# Patient Record
Sex: FEMALE | Race: WHITE | NOT HISPANIC OR LATINO | Employment: FULL TIME | URBAN - METROPOLITAN AREA
[De-identification: names, ages, dates, MRNs, and addresses within clinical notes are randomized per-mention and may not be internally consistent; named-entity substitution may affect disease eponyms.]

---

## 2017-06-18 ENCOUNTER — COMMUNICATION - HEALTHEAST (OUTPATIENT)
Dept: FAMILY MEDICINE | Facility: CLINIC | Age: 47
End: 2017-06-18

## 2017-06-18 DIAGNOSIS — F41.1 ANXIETY STATE: ICD-10-CM

## 2017-06-18 DIAGNOSIS — F32.1 MAJOR DEPRESSIVE DISORDER, SINGLE EPISODE, MODERATE (H): ICD-10-CM

## 2017-09-17 ENCOUNTER — COMMUNICATION - HEALTHEAST (OUTPATIENT)
Dept: FAMILY MEDICINE | Facility: CLINIC | Age: 47
End: 2017-09-17

## 2017-09-17 DIAGNOSIS — F32.1 MAJOR DEPRESSIVE DISORDER, SINGLE EPISODE, MODERATE (H): ICD-10-CM

## 2017-09-17 DIAGNOSIS — F41.1 ANXIETY STATE: ICD-10-CM

## 2017-09-22 ENCOUNTER — HOSPITAL ENCOUNTER (OUTPATIENT)
Dept: MAMMOGRAPHY | Facility: CLINIC | Age: 47
Discharge: HOME | End: 2017-09-22
Attending: FAMILY MEDICINE

## 2017-09-22 DIAGNOSIS — Z12.31 VISIT FOR SCREENING MAMMOGRAM: ICD-10-CM

## 2017-11-24 ENCOUNTER — OFFICE VISIT - HEALTHEAST (OUTPATIENT)
Dept: FAMILY MEDICINE | Facility: CLINIC | Age: 47
End: 2017-11-24

## 2017-11-24 DIAGNOSIS — Z00.00 ROUTINE GENERAL MEDICAL EXAMINATION AT A HEALTH CARE FACILITY: ICD-10-CM

## 2017-11-24 DIAGNOSIS — M13.0 POLYARTHRITIS: ICD-10-CM

## 2017-11-24 DIAGNOSIS — F32.1 MAJOR DEPRESSIVE DISORDER, SINGLE EPISODE, MODERATE (H): ICD-10-CM

## 2017-11-24 DIAGNOSIS — R61 NIGHT SWEATS: ICD-10-CM

## 2017-11-24 DIAGNOSIS — Z12.4 SCREENING FOR CERVICAL CANCER: ICD-10-CM

## 2017-11-24 DIAGNOSIS — F41.1 ANXIETY STATE: ICD-10-CM

## 2017-11-27 LAB — ANA SER QL: 0.5 U

## 2017-11-30 LAB
HPV INTERPRETATION - HISTORICAL: NORMAL
HPV INTERPRETER - HISTORICAL: NORMAL

## 2017-12-06 LAB
BKR LAB AP ABNORMAL BLEEDING: NO
BKR LAB AP BIRTH CONTROL/HORMONES: NORMAL
BKR LAB AP CERVICAL APPEARANCE: NORMAL
BKR LAB AP GYN ADEQUACY: NORMAL
BKR LAB AP GYN INTERPRETATION: NORMAL
BKR LAB AP GYN OTHER FINDINGS: NORMAL
BKR LAB AP HPV REFLEX: NORMAL
BKR LAB AP LMP: NORMAL
BKR LAB AP PATIENT STATUS: NORMAL
BKR LAB AP PREVIOUS ABNORMAL: NORMAL
BKR LAB AP PREVIOUS NORMAL: NORMAL
HIGH RISK?: NO
PATH REPORT.COMMENTS IMP SPEC: NORMAL
RESULT FLAG (HE HISTORICAL CONVERSION): NORMAL

## 2018-03-08 ENCOUNTER — OFFICE VISIT - HEALTHEAST (OUTPATIENT)
Dept: CARDIOLOGY | Facility: CLINIC | Age: 48
End: 2018-03-08

## 2018-03-08 DIAGNOSIS — R07.9 CHEST PAIN, UNSPECIFIED TYPE: ICD-10-CM

## 2018-03-08 ASSESSMENT — MIFFLIN-ST. JEOR: SCORE: 1416.47

## 2018-03-13 ENCOUNTER — OFFICE VISIT - HEALTHEAST (OUTPATIENT)
Dept: FAMILY MEDICINE | Facility: CLINIC | Age: 48
End: 2018-03-13

## 2018-03-13 ENCOUNTER — HOSPITAL ENCOUNTER (OUTPATIENT)
Dept: MRI IMAGING | Facility: HOSPITAL | Age: 48
Discharge: HOME | End: 2018-03-13
Attending: FAMILY MEDICINE

## 2018-03-13 ENCOUNTER — HOSPITAL ENCOUNTER (OUTPATIENT)
Dept: RADIOLOGY | Facility: HOSPITAL | Age: 48
Discharge: HOME | End: 2018-03-13
Attending: FAMILY MEDICINE

## 2018-03-13 DIAGNOSIS — M54.50 LOW BACK PAIN RADIATING TO RIGHT LEG: ICD-10-CM

## 2018-03-13 DIAGNOSIS — M79.604 RIGHT LEG PAIN: ICD-10-CM

## 2018-03-13 DIAGNOSIS — M79.604 LOW BACK PAIN RADIATING TO RIGHT LEG: ICD-10-CM

## 2018-03-13 RX ORDER — IBUPROFEN 200 MG
600 TABLET ORAL EVERY 6 HOURS PRN
Status: SHIPPED | COMMUNITY
Start: 2018-03-13

## 2018-03-13 ASSESSMENT — MIFFLIN-ST. JEOR: SCORE: 1416.47

## 2018-03-14 ENCOUNTER — AMBULATORY - HEALTHEAST (OUTPATIENT)
Dept: FAMILY MEDICINE | Facility: CLINIC | Age: 48
End: 2018-03-14

## 2018-03-14 DIAGNOSIS — M54.16 LUMBAR RADICULOPATHY, ACUTE: ICD-10-CM

## 2018-03-15 ENCOUNTER — COMMUNICATION - HEALTHEAST (OUTPATIENT)
Dept: CARDIOLOGY | Facility: CLINIC | Age: 48
End: 2018-03-15

## 2018-03-19 ENCOUNTER — HOSPITAL ENCOUNTER (OUTPATIENT)
Dept: PHYSICAL MEDICINE AND REHAB | Facility: CLINIC | Age: 48
Discharge: HOME | End: 2018-03-19
Attending: FAMILY MEDICINE

## 2018-03-19 DIAGNOSIS — M53.3 SACROILIAC JOINT PAIN: ICD-10-CM

## 2018-03-19 DIAGNOSIS — M54.16 LUMBAR RADICULITIS: ICD-10-CM

## 2018-03-19 DIAGNOSIS — M51.26 LUMBAR DISC HERNIATION: ICD-10-CM

## 2018-03-20 ENCOUNTER — COMMUNICATION - HEALTHEAST (OUTPATIENT)
Dept: FAMILY MEDICINE | Facility: CLINIC | Age: 48
End: 2018-03-20

## 2018-03-20 ENCOUNTER — HOSPITAL ENCOUNTER (OUTPATIENT)
Dept: CT IMAGING | Facility: CLINIC | Age: 48
Discharge: HOME | End: 2018-03-20
Attending: INTERNAL MEDICINE

## 2018-03-20 DIAGNOSIS — R07.9 CHEST PAIN, UNSPECIFIED TYPE: ICD-10-CM

## 2018-03-20 LAB
BSA FOR ECHO PROCEDURE: 1.86 M2
CV CALCIUM SCORE AGATSTON LM: 0
CV CALCIUM SCORING AGATSON LAD: 0
CV CALCIUM SCORING AGATSTON CX: 0
CV CALCIUM SCORING AGATSTON RCA: 0
CV CALCIUM SCORING AGATSTON TOTAL: 0
LEFT VENTRICLE HEART RATE: 62 BPM

## 2018-03-20 ASSESSMENT — MIFFLIN-ST. JEOR: SCORE: 1380.19

## 2018-10-15 ENCOUNTER — HOSPITAL ENCOUNTER (OUTPATIENT)
Dept: MAMMOGRAPHY | Facility: CLINIC | Age: 48
Discharge: HOME | End: 2018-10-15
Attending: FAMILY MEDICINE

## 2018-10-15 DIAGNOSIS — Z12.31 VISIT FOR SCREENING MAMMOGRAM: ICD-10-CM

## 2019-02-03 ENCOUNTER — COMMUNICATION - HEALTHEAST (OUTPATIENT)
Dept: FAMILY MEDICINE | Facility: CLINIC | Age: 49
End: 2019-02-03

## 2019-02-03 DIAGNOSIS — F32.1 MAJOR DEPRESSIVE DISORDER, SINGLE EPISODE, MODERATE (H): ICD-10-CM

## 2019-02-03 DIAGNOSIS — F41.1 ANXIETY STATE: ICD-10-CM

## 2019-03-06 ENCOUNTER — OFFICE VISIT - HEALTHEAST (OUTPATIENT)
Dept: FAMILY MEDICINE | Facility: CLINIC | Age: 49
End: 2019-03-06

## 2019-03-06 DIAGNOSIS — F41.1 ANXIETY STATE: ICD-10-CM

## 2019-03-06 DIAGNOSIS — J06.9 VIRAL URI WITH COUGH: ICD-10-CM

## 2019-03-06 DIAGNOSIS — F32.1 MAJOR DEPRESSIVE DISORDER, SINGLE EPISODE, MODERATE (H): ICD-10-CM

## 2019-11-24 ENCOUNTER — COMMUNICATION - HEALTHEAST (OUTPATIENT)
Dept: FAMILY MEDICINE | Facility: CLINIC | Age: 49
End: 2019-11-24

## 2019-11-24 DIAGNOSIS — F32.1 MAJOR DEPRESSIVE DISORDER, SINGLE EPISODE, MODERATE (H): ICD-10-CM

## 2019-11-24 DIAGNOSIS — F41.1 ANXIETY STATE: ICD-10-CM

## 2019-12-27 ENCOUNTER — HOSPITAL ENCOUNTER (OUTPATIENT)
Dept: MAMMOGRAPHY | Facility: CLINIC | Age: 49
Discharge: HOME | End: 2019-12-27
Attending: FAMILY MEDICINE

## 2019-12-27 DIAGNOSIS — Z12.31 VISIT FOR SCREENING MAMMOGRAM: ICD-10-CM

## 2020-03-08 ENCOUNTER — COMMUNICATION - HEALTHEAST (OUTPATIENT)
Dept: FAMILY MEDICINE | Facility: CLINIC | Age: 50
End: 2020-03-08

## 2020-03-08 DIAGNOSIS — F41.1 ANXIETY STATE: ICD-10-CM

## 2020-03-08 DIAGNOSIS — F32.1 MAJOR DEPRESSIVE DISORDER, SINGLE EPISODE, MODERATE (H): ICD-10-CM

## 2020-09-28 ENCOUNTER — OFFICE VISIT - HEALTHEAST (OUTPATIENT)
Dept: FAMILY MEDICINE | Facility: CLINIC | Age: 50
End: 2020-09-28

## 2020-09-28 DIAGNOSIS — N92.1 MENORRHAGIA WITH IRREGULAR CYCLE: ICD-10-CM

## 2020-09-28 DIAGNOSIS — M19.041 ARTHRITIS OF BOTH HANDS: ICD-10-CM

## 2020-09-28 DIAGNOSIS — M19.042 ARTHRITIS OF BOTH HANDS: ICD-10-CM

## 2020-09-28 DIAGNOSIS — Z00.00 ROUTINE GENERAL MEDICAL EXAMINATION AT A HEALTH CARE FACILITY: ICD-10-CM

## 2020-09-28 DIAGNOSIS — Z12.11 SCREEN FOR COLON CANCER: ICD-10-CM

## 2020-09-28 LAB
CHOLEST SERPL-MCNC: 197 MG/DL
FASTING STATUS PATIENT QL REPORTED: YES
FASTING STATUS PATIENT QL REPORTED: YES
GLUCOSE BLD-MCNC: 79 MG/DL (ref 70–99)
HDLC SERPL-MCNC: 71 MG/DL
LDLC SERPL CALC-MCNC: 117 MG/DL
TRIGL SERPL-MCNC: 47 MG/DL
TSH SERPL DL<=0.005 MIU/L-ACNC: 0.97 UIU/ML (ref 0.3–5)

## 2020-09-28 RX ORDER — FLUOROURACIL 50 MG/G
CREAM TOPICAL
Status: SHIPPED | COMMUNITY
Start: 2020-05-14

## 2020-09-28 ASSESSMENT — MIFFLIN-ST. JEOR: SCORE: 1457.3

## 2020-09-28 ASSESSMENT — PATIENT HEALTH QUESTIONNAIRE - PHQ9: SUM OF ALL RESPONSES TO PHQ QUESTIONS 1-9: 0

## 2020-10-02 ENCOUNTER — AMBULATORY - HEALTHEAST (OUTPATIENT)
Dept: SURGERY | Facility: AMBULATORY SURGERY CENTER | Age: 50
End: 2020-10-02

## 2020-10-02 DIAGNOSIS — Z11.59 ENCOUNTER FOR SCREENING FOR OTHER VIRAL DISEASES: ICD-10-CM

## 2020-11-05 ENCOUNTER — AMBULATORY - HEALTHEAST (OUTPATIENT)
Dept: SURGERY | Facility: CLINIC | Age: 50
End: 2020-11-05

## 2020-11-05 DIAGNOSIS — Z11.59 ENCOUNTER FOR SCREENING FOR OTHER VIRAL DISEASES: ICD-10-CM

## 2020-11-06 ENCOUNTER — AMBULATORY - HEALTHEAST (OUTPATIENT)
Dept: LAB | Facility: CLINIC | Age: 50
End: 2020-11-06

## 2020-11-06 DIAGNOSIS — Z11.59 ENCOUNTER FOR SCREENING FOR OTHER VIRAL DISEASES: ICD-10-CM

## 2020-11-08 ENCOUNTER — COMMUNICATION - HEALTHEAST (OUTPATIENT)
Dept: SCHEDULING | Facility: CLINIC | Age: 50
End: 2020-11-08

## 2020-11-09 ENCOUNTER — ANESTHESIA - HEALTHEAST (OUTPATIENT)
Dept: SURGERY | Facility: AMBULATORY SURGERY CENTER | Age: 50
End: 2020-11-09

## 2020-11-09 ASSESSMENT — MIFFLIN-ST. JEOR: SCORE: 1448.22

## 2020-11-10 ENCOUNTER — SURGERY - HEALTHEAST (OUTPATIENT)
Dept: SURGERY | Facility: AMBULATORY SURGERY CENTER | Age: 50
End: 2020-11-10

## 2020-11-20 ENCOUNTER — AMBULATORY - HEALTHEAST (OUTPATIENT)
Dept: LAB | Facility: CLINIC | Age: 50
End: 2020-11-20

## 2020-11-20 DIAGNOSIS — Z11.59 ENCOUNTER FOR SCREENING FOR OTHER VIRAL DISEASES: ICD-10-CM

## 2020-11-20 ASSESSMENT — MIFFLIN-ST. JEOR: SCORE: 1425.54

## 2020-11-21 LAB
SARS-COV-2 PCR COMMENT: NORMAL
SARS-COV-2 RNA SPEC QL NAA+PROBE: NEGATIVE
SARS-COV-2 VIRUS SPECIMEN SOURCE: NORMAL

## 2020-11-22 ENCOUNTER — COMMUNICATION - HEALTHEAST (OUTPATIENT)
Dept: SCHEDULING | Facility: CLINIC | Age: 50
End: 2020-11-22

## 2020-11-24 ENCOUNTER — ANESTHESIA - HEALTHEAST (OUTPATIENT)
Dept: SURGERY | Facility: CLINIC | Age: 50
End: 2020-11-24

## 2020-11-24 ENCOUNTER — SURGERY - HEALTHEAST (OUTPATIENT)
Dept: SURGERY | Facility: CLINIC | Age: 50
End: 2020-11-24

## 2020-11-24 ASSESSMENT — MIFFLIN-ST. JEOR: SCORE: 1454.58

## 2021-05-27 ASSESSMENT — PATIENT HEALTH QUESTIONNAIRE - PHQ9: SUM OF ALL RESPONSES TO PHQ QUESTIONS 1-9: 0

## 2021-05-29 ENCOUNTER — RECORDS - HEALTHEAST (OUTPATIENT)
Dept: ADMINISTRATIVE | Facility: CLINIC | Age: 51
End: 2021-05-29

## 2021-05-30 ENCOUNTER — RECORDS - HEALTHEAST (OUTPATIENT)
Dept: ADMINISTRATIVE | Facility: CLINIC | Age: 51
End: 2021-05-30

## 2021-05-31 VITALS — WEIGHT: 175 LBS | BODY MASS INDEX: 28.25 KG/M2

## 2021-06-01 VITALS — HEIGHT: 66 IN | WEIGHT: 173 LBS | BODY MASS INDEX: 27.8 KG/M2

## 2021-06-01 VITALS — WEIGHT: 165 LBS | BODY MASS INDEX: 26.52 KG/M2 | HEIGHT: 66 IN

## 2021-06-01 VITALS — BODY MASS INDEX: 28.08 KG/M2 | WEIGHT: 174 LBS

## 2021-06-01 VITALS — BODY MASS INDEX: 27.8 KG/M2 | WEIGHT: 173 LBS | HEIGHT: 66 IN

## 2021-06-02 VITALS — WEIGHT: 175 LBS | BODY MASS INDEX: 28.25 KG/M2

## 2021-06-03 NOTE — TELEPHONE ENCOUNTER
Refill Approved    Rx renewed per Medication Renewal Policy. Medication was last renewed on 3/6/2019 with 1 refill.   Last office visit: 3/6/2019 with PCP Dr RICARDO Houser    Iselajihan Kim, Care Connection Triage/Med Refill 11/24/2019     Requested Prescriptions   Pending Prescriptions Disp Refills     buPROPion (WELLBUTRIN XL) 150 MG 24 hr tablet [Pharmacy Med Name: BUPROPION XL 150MG TABLETS (24 H)] 90 tablet 0     Sig: TAKE 1 TABLET(150 MG) BY MOUTH EVERY MORNING       Tricyclics/Misc Antidepressant/Antianxiety Meds Refill Protocol Passed - 11/24/2019  7:26 PM        Passed - PCP or prescribing provider visit in last year     Last office visit with prescriber/PCP: 3/6/2019 Isatu Houser MD OR same dept: 3/6/2019 Isatu Houser MD OR same specialty: 3/6/2019 Isatu Houser MD  Last physical: 11/24/2017 Last MTM visit: Visit date not found   Next visit within 3 mo: Visit date not found  Next physical within 3 mo: Visit date not found  Prescriber OR PCP: Isatu Houser MD  Last diagnosis associated with med order: 1. Anxiety state  - buPROPion (WELLBUTRIN XL) 150 MG 24 hr tablet [Pharmacy Med Name: BUPROPION XL 150MG TABLETS (24 H)]; TAKE 1 TABLET(150 MG) BY MOUTH EVERY MORNING  Dispense: 90 tablet; Refill: 0    2. Major depressive disorder, single episode, moderate (H)  - buPROPion (WELLBUTRIN XL) 150 MG 24 hr tablet [Pharmacy Med Name: BUPROPION XL 150MG TABLETS (24 H)]; TAKE 1 TABLET(150 MG) BY MOUTH EVERY MORNING  Dispense: 90 tablet; Refill: 0    If protocol passes may refill for 12 months if within 3 months of last provider visit (or a total of 15 months).

## 2021-06-05 ENCOUNTER — RECORDS - HEALTHEAST (OUTPATIENT)
Dept: LAB | Facility: CLINIC | Age: 51
End: 2021-06-05

## 2021-06-05 VITALS — BODY MASS INDEX: 29.15 KG/M2 | HEIGHT: 66 IN | WEIGHT: 181.4 LBS

## 2021-06-05 VITALS
OXYGEN SATURATION: 98 % | SYSTOLIC BLOOD PRESSURE: 120 MMHG | HEART RATE: 68 BPM | WEIGHT: 182 LBS | BODY MASS INDEX: 29.25 KG/M2 | DIASTOLIC BLOOD PRESSURE: 68 MMHG | HEIGHT: 66 IN

## 2021-06-05 VITALS — BODY MASS INDEX: 28.93 KG/M2 | HEIGHT: 66 IN | WEIGHT: 180 LBS

## 2021-06-05 DIAGNOSIS — N94.9 SYMPTOM ASSOCIATED WITH FEMALE GENITAL ORGANS: ICD-10-CM

## 2021-06-06 NOTE — TELEPHONE ENCOUNTER
Refill NOT Given    Refill given per Policy, patient informed they are overdue for Office Visit   OV 3/6/19    Saima Akhtar, [facility] Connection Triage/Med Refill 3/8/2020    Requested Prescriptions   Pending Prescriptions Disp Refills     buPROPion (WELLBUTRIN XL) 150 MG 24 hr tablet [Pharmacy Med Name: BUPROPION XL 150MG TABLETS (24 H)] 90 tablet 0     Sig: TAKE 1 TABLET(150 MG) BY MOUTH EVERY MORNING       Tricyclics/Misc Antidepressant/Antianxiety Meds Refill Protocol Failed - 3/8/2020 10:34 AM        Failed - PCP or prescribing provider visit in last year     Last office visit with prescriber/PCP: 3/6/2019 Isatu Houser MD OR same dept: Visit date not found OR same specialty: 3/6/2019 Isatu Houser MD  Last physical: 11/24/2017 Last MTM visit: Visit date not found   Next visit within 3 mo: Visit date not found  Next physical within 3 mo: Visit date not found  Prescriber OR PCP: Isatu Houser MD  Last diagnosis associated with med order: 1. Anxiety state  - buPROPion (WELLBUTRIN XL) 150 MG 24 hr tablet [Pharmacy Med Name: BUPROPION XL 150MG TABLETS (24 H)]; TAKE 1 TABLET(150 MG) BY MOUTH EVERY MORNING  Dispense: 90 tablet; Refill: 0    2. Major depressive disorder, single episode, moderate (H)  - buPROPion (WELLBUTRIN XL) 150 MG 24 hr tablet [Pharmacy Med Name: BUPROPION XL 150MG TABLETS (24 H)]; TAKE 1 TABLET(150 MG) BY MOUTH EVERY MORNING  Dispense: 90 tablet; Refill: 0    If protocol passes may refill for 12 months if within 3 months of last provider visit (or a total of 15 months).

## 2021-06-11 NOTE — PROGRESS NOTES
Assessment/Plan:      Healthy female exam.   Problem List Items Addressed This Visit     None      Visit Diagnoses     Routine general medical examination at a health care facility    -  Primary    Relevant Orders    Glucose (Completed)    Lipid Profile    Screen for colon cancer        Relevant Orders    Ambulatory referral for Colonoscopy    Arthritis of both hands        Relevant Orders    Ambulatory referral to Rheumatology    Menorrhagia with irregular cycle        Relevant Orders    Thyroid Socorro    Ambulatory referral to Gynecology        The patient has a family history of significant arthritis of her hands.  By history, it sounds like osteoarthritis and this would fit with the visual appearance of her hands today as well as a negative work-up in the recent past.  However, she is quite concerned about it and as such I think it is reasonable for her to have a discussion with a rheumatologist.  I did mention diclofenac cream as an option she could try for symptomatic relief.  In addition, the patient is having increasingly heavy and prolonged menstrual cycles.  I explained that this really does deserve some evaluation and a pelvic ultrasound would be a good start.  However, after discussing some of the treatment options and that she would more strongly consider a uterine ablation, I recommended simply getting a consultation with a gynecologist for further evaluation and treatment.       Subjective:      Sonia Cordero is a 50 y.o. female who presents for an annual exam.  The patient is overall doing reasonably well although has had some increased stressors recently including a son who has had recurrence of a rare soft tissue cancer of his arm.  From a health perspective, the patient reports concern regarding increasing pain and some deformity involving her hands bilaterally she has a strong family history of arthritis of her hands and a couple of female relatives including her grandmother.  Also, the patient  has been having increasingly irregular as well as heavy and prolonged menstrual cycles.  It is bothersome enough that she would consider doing something at this point although does not want hormones or an IUD and would consider a uterine ablation.    Healthy Habits:   Regular Exercise: No  Sunscreen Use: Yes  Healthy Diet: struggling  Dental Visits Regularly: Yes  Seat Belt: Yes  Sexually active: Yes  Colon cancer screening: Yes  Lipid Profile: Yes  Glucose Screen: Yes  Prevention of Osteoporosis: Yes  Last Dexa: N/A      Immunization History   Administered Date(s) Administered     Hep A, historic 2008, 2009     INFLUENZA,SEASONAL QUAD, PF, =/> 6months 10/17/2016, 2020     Influenza, inj, historic,unspecified 10/04/2012     Td, adult adsorbed, PF 10/17/2016     Tdap 2007     Immunization status: up to date and documented.    Gynecologic History  No LMP recorded.  Contraception: none  Last Pap: 2017. Results were: normal  Last mammogram: . Results were: normal      OB History    Para Term  AB Living   4 4 4         SAB TAB Ectopic Multiple Live Births                  # Outcome Date GA Lbr Umer/2nd Weight Sex Delivery Anes PTL Lv   4 Term            3 Term            2 Term            1 Term                Current Outpatient Medications   Medication Sig Dispense Refill     cephalexin (KEFLEX) 500 MG capsule Take 500 mg by mouth 2 (two) times a day.       cholecalciferol, vitamin D3, 50 mcg (2,000 unit) Tab Take 2,000 Units by mouth.       fluorouraciL (EFUDEX) 5 % cream APPLY ONE TIME A DAY TO ACTINIC KERATOSES FOR 4 WEEKS. WASH OFF IN THE MORNING       ibuprofen (ADVIL,MOTRIN) 200 MG tablet Take 600 mg by mouth every 6 (six) hours as needed for pain.       Lactobacillus acidophilus (PROBIOTIC ORAL) Take by mouth.       No current facility-administered medications for this visit.      Past Medical History:   Diagnosis Date     Breast cyst      Past Surgical History:    Procedure Laterality Date     BREAST CYST EXCISION Right 1997     Patient has no known allergies.  Family History   Problem Relation Age of Onset     Breast cancer Maternal Grandmother 80     Breast cancer Maternal Aunt 40     Lung cancer Maternal Grandfather      Acute Myocardial Infarction Father      Acute Myocardial Infarction Paternal Aunt      Acute Myocardial Infarction Paternal Uncle      Social History     Socioeconomic History     Marital status:      Spouse name: Not on file     Number of children: Not on file     Years of education: Not on file     Highest education level: Not on file   Occupational History     Not on file   Social Needs     Financial resource strain: Not on file     Food insecurity     Worry: Not on file     Inability: Not on file     Transportation needs     Medical: Not on file     Non-medical: Not on file   Tobacco Use     Smoking status: Never Smoker     Smokeless tobacco: Never Used   Substance and Sexual Activity     Alcohol use: Not on file     Drug use: Not on file     Sexual activity: Not on file   Lifestyle     Physical activity     Days per week: Not on file     Minutes per session: Not on file     Stress: Not on file   Relationships     Social connections     Talks on phone: Not on file     Gets together: Not on file     Attends [employer] service: Not on file     Active member of club or organization: Not on file     Attends meetings of clubs or organizations: Not on file     Relationship status: Not on file     Intimate partner violence     Fear of current or ex partner: Not on file     Emotionally abused: Not on file     Physically abused: Not on file     Forced sexual activity: Not on file   Other Topics Concern     Not on file   Social History Narrative     Not on file       Review of Systems  General:  Denies problem  Eyes: Denies problem  Ears/Nose/Throat: Denies problem  Cardiovascular: Denies problem  Respiratory:  Denies problem  Gastrointestinal:  Denies  "problem, Genitourinary: Denies problem  Musculoskeletal:  Denies problem  Skin: Denies problem  Neurologic: Denies problem  Psychiatric: Denies problem  Endocrine: Denies problem  Heme/Lymphatic: Denies problem   Allergic/Immunologic: Denies problem        Objective:         Vitals:    09/28/20 0919   BP: 120/68   Pulse: 68   SpO2: 98%   Weight: 182 lb (82.6 kg)   Height: 5' 6\" (1.676 m)     Body mass index is 29.38 kg/m .    Physical Exam:  General Appearance: Alert, cooperative, no distress, appears stated age  Head: Normocephalic, without obvious abnormality, atraumatic  Eyes: PERRL, conjunctiva/corneas clear, EOM's intact  Ears: Normal TM's and external ear canals, both ears  Nose: Nares normal, septum midline,mucosa normal, no drainage  Throat: Lips, mucosa, and tongue normal; teeth and gums normal  Neck: Supple, symmetrical, trachea midline, no adenopathy;  thyroid: not enlarged, symmetric, no tenderness/mass/nodules; no carotid bruit or JVD  Back: Symmetric, no curvature, ROM normal, no CVA tenderness  Lungs: Clear to auscultation bilaterally, respirations unlabored  Breasts: No breast masses, tenderness, asymmetry, or nipple discharge.  Heart: Regular rate and rhythm, S1 and S2 normal, no murmur, rub, or gallop, Abdomen: Soft, non-tender, bowel sounds active all four quadrants,  no masses, no organomegaly  Pelvic:Not examined  Extremities: Extremities normal, atraumatic, no cyanosis or edema  Skin: Skin color, texture, turgor normal, no rashes or lesions  Lymph nodes: Cervical, supraclavicular, and axillary nodes normal  Neurologic: Normal        "

## 2021-06-12 NOTE — ANESTHESIA PREPROCEDURE EVALUATION
Anesthesia Evaluation      Patient summary reviewed     Airway   Mallampati: II  Neck ROM: full   Pulmonary - negative ROS and normal exam                          Cardiovascular - negative ROS and normal exam   Neuro/Psych    (+) depression, anxiety/panic attacks,     Endo/Other - negative ROS      GI/Hepatic/Renal - negative ROS           Dental - normal exam                        Anesthesia Plan  Planned anesthetic: MAC    ASA 2   Induction: intravenous   Anesthetic plan and risks discussed with: patient    Post-op plan: routine recovery

## 2021-06-12 NOTE — ANESTHESIA CARE TRANSFER NOTE
2Last vitals:   Vitals:    11/10/20 0718   BP: 127/67   Pulse: 73   Resp: 16   Temp: 36.9  C (98.5  F)   SpO2: 98%     Patient's level of consciousness is drowsy  Spontaneous respirations: yes  Maintains airway independently: yes  Dentition unchanged: yes  Oropharynx: oropharynx clear of all foreign objects    QCDR Measures:  ASA# 20 - Surgical Safety Checklist: WHO surgical safety checklist completed prior to induction    PQRS# 430 - Adult PONV Prevention: 4558F - Pt received => 2 anti-emetic agents (different classes) preop & intraop  ASA# 8 - Peds PONV Prevention: NA - Not pediatric patient, not GA or 2 or more risk factors NOT present  PQRS# 424 - Mariella-op Temp Management: 4559F - At least one body temp DOCUMENTED => 35.5C or 95.9F within required timeframe  PQRS# 426 - PACU Transfer Protocol: - Transfer of care checklist used  ASA# 14 - Acute Post-op Pain: ASA14B - Patient did NOT experience pain >= 7 out of 10

## 2021-06-13 NOTE — ANESTHESIA PREPROCEDURE EVALUATION
Anesthesia Evaluation        Airway   Mallampati: II   Pulmonary     breath sounds clear to auscultation  (-) asthma, shortness of breath                         Cardiovascular   Exercise tolerance: > or = 10 METS  (-) hypertension, angina  Rhythm: regular  Rate: normal,         Neuro/Psych    (+) anxiety/panic attacks,     Endo/Other    (-) no diabetes     GI/Hepatic/Renal    (-) renal disease          Dental                         Anesthesia Plan  Planned anesthetic: general endotracheal    ASA 2   Induction: intravenous   Anesthetic plan and risks discussed with: patient  Anesthesia plan special considerations: antiemetics,

## 2021-06-13 NOTE — ANESTHESIA POSTPROCEDURE EVALUATION
Patient: Sonia Cordero  Procedure(s):  TRANSVAGINAL TAPING  HYSTEROSCOPY DILATION AND CURETTAGE NOVASURE ENDOMETRIAL ABLATION  Anesthesia type: general    Patient location: PACU  Last vitals:   Vitals Value Taken Time   /63 11/24/20 1430   Temp 35.6  C (96.1  F) 11/24/20 1413   Pulse 67 11/24/20 1436   Resp 12 11/24/20 1436   SpO2 97 % 11/24/20 1436   Vitals shown include unvalidated device data.  Post vital signs: stable  Level of consciousness: awake and responds to simple questions  Post-anesthesia pain: pain controlled  Post-anesthesia nausea and vomiting: no  Pulmonary: unassisted, return to baseline  Cardiovascular: stable and blood pressure at baseline  Hydration: adequate  Anesthetic events: no    QCDR Measures:  ASA# 11 - Mariella-op Cardiac Arrest: ASA11B - Patient did NOT experience unanticipated cardiac arrest  ASA# 12 - Mariella-op Mortality Rate: ASA12B - Patient did NOT die  ASA# 13 - PACU Re-Intubation Rate: ASA13B - Patient did NOT require a new airway mgmt  ASA# 10 - Composite Anes Safety: ASA10A - No serious adverse event    Additional Notes:

## 2021-06-13 NOTE — ANESTHESIA POSTPROCEDURE EVALUATION
Patient: Sonia Cordero  Procedure(s):  COLONOSCOPY  Anesthesia type: MAC    Patient location: PACU  Last vitals:   Vitals Value Taken Time   /71 11/10/20 0845   Temp 36.2  C (97.1  F) 11/10/20 0816   Pulse 63 11/10/20 0859   Resp 16 11/10/20 0816   SpO2 95 % 11/10/20 0859     Post vital signs: stable  Level of consciousness: awake and responds to simple questions  Post-anesthesia pain: pain controlled  Post-anesthesia nausea and vomiting: no  Pulmonary: unassisted, return to baseline  Cardiovascular: stable and blood pressure at baseline  Hydration: adequate  Anesthetic events: no    QCDR Measures:  ASA# 11 - Mariella-op Cardiac Arrest: ASA11B - Patient did NOT experience unanticipated cardiac arrest  ASA# 12 - Mariella-op Mortality Rate: ASA12B - Patient did NOT die  ASA# 13 - PACU Re-Intubation Rate: ASA13B - Patient did NOT require a new airway mgmt  ASA# 10 - Composite Anes Safety: ASA10A - No serious adverse event    Additional Notes:

## 2021-06-13 NOTE — ANESTHESIA CARE TRANSFER NOTE
Last vitals:   Vitals:    11/24/20 1413   BP: 113/63   Pulse: 74   Resp: 16   Temp: (!) 35.6  C (96.1  F)   SpO2: 100%     Patient's level of consciousness is drowsy  Spontaneous respirations: yes  Maintains airway independently: yes  Dentition unchanged: yes  Oropharynx: oropharynx clear of all foreign objects    QCDR Measures:  ASA# 20 - Surgical Safety Checklist: WHO surgical safety checklist completed prior to induction    PQRS# 430 - Adult PONV Prevention: 4558F - Pt received => 2 anti-emetic agents (different classes) preop & intraop  ASA# 8 - Peds PONV Prevention: NA - Not pediatric patient, not GA or 2 or more risk factors NOT present  PQRS# 424 - Mariella-op Temp Management: 4559F - At least one body temp DOCUMENTED => 35.5C or 95.9F within required timeframe  PQRS# 426 - PACU Transfer Protocol: - Transfer of care checklist used  ASA# 14 - Acute Post-op Pain: ASA14B - Patient did NOT experience pain >= 7 out of 10

## 2021-06-14 NOTE — PROGRESS NOTES
Assessment/Plan:      Healthy female exam.   1. Anxiety state  - buPROPion (WELLBUTRIN XL) 150 MG 24 hr tablet; TAKE 1 TABLET BY MOUTH EVERY MORNING  Dispense: 90 tablet; Refill: 3    2. Major depressive disorder, single episode, moderate  - buPROPion (WELLBUTRIN XL) 150 MG 24 hr tablet; TAKE 1 TABLET BY MOUTH EVERY MORNING  Dispense: 90 tablet; Refill: 3    3. Routine general medical examination at a health care facility  Reviewed cancer screening and updated pap smear today. She is UTD on her mammogram. Discussed role of vitamin D and calcium in bone health. She is UTD on her immunizations. Discussed cardiovascular health with routine exercise and diet recommendations.     4. Polyarthritis  - Rheumatoid Factor Quant  - Erythrocyte Sedimentation Rate  - C-Reactive Protein  - Antinuclear Antibody (CLIFFORD) Cascade    5. Night sweats  - Thyroid Cascade  - HM2(CBC w/o Differential)    6. Screening for cervical cancer  - Gynecologic Cytology (PAP Smear)         Subjective:      Sonia Cordero is a 47 y.o. female who presents for an annual exam. The patient overall is doing well, although does have a question related to her joints of her hands. She reports that her joints of her fingers seem more stiff and a bit sore at times. She denies pain in other joints. She denies any family history of a rheumatoid arthritis or other inflammatory arthropathy. She has also been having some hot flashes at times and sometime during the night feels very hot.       Healthy Habits:   Regular Exercise: No  Sunscreen Use: Yes  Healthy Diet: Yes; gets home late  Dental Visits Regularly: Yes  Seat Belt: Yes  Sexually active: Yes  Self Breast Exam Monthly:Yes  Colonoscopy: N/A  Lipid Profile: N/A  Glucose Screen: N/A  Prevention of Osteoporosis: No  Last Dexa: N/A      Immunization History   Administered Date(s) Administered     Hep A, historic 03/04/2008, 09/11/2009     Influenza, inj, historic,unspecified 10/04/2012     Influenza,seasonal  quad, PF, 36+MOS 10/17/2016     Td, adult adsorbed, PF 10/17/2016     Tdap 2007     Immunization status: up to date and documented.    Gynecologic History  Patient's last menstrual period was 10/31/2017.  Contraception: vasectomy  Last Pap: . Results were: normal  Last mammogram: . Results were: normal      OB History    Para Term  AB Living   4 4 4      SAB TAB Ectopic Multiple Live Births             # Outcome Date GA Lbr Umer/2nd Weight Sex Delivery Anes PTL Lv   4 Term            3 Term            2 Term            1 Term                   Current Outpatient Prescriptions   Medication Sig Dispense Refill     adapalene (DIFFERIN) 0.1 % gel RAYNA TOPICALLY HS FOR ACNE  3     buPROPion (WELLBUTRIN XL) 150 MG 24 hr tablet TAKE 1 TABLET BY MOUTH EVERY MORNING 90 tablet 3     cefuroxime (CEFTIN) 250 MG tablet TK 1 T PO QD  2     No current facility-administered medications for this visit.      Past Medical History:   Diagnosis Date     Breast cyst      Past Surgical History:   Procedure Laterality Date     BREAST CYST EXCISION Right      Review of patient's allergies indicates no known allergies.  Family History   Problem Relation Age of Onset     Breast cancer Maternal Grandmother 80     Breast cancer Maternal Aunt 40     Lung cancer Maternal Grandfather      Social History     Social History     Marital status:      Spouse name: N/A     Number of children: N/A     Years of education: N/A     Occupational History     Not on file.     Social History Main Topics     Smoking status: Never Smoker     Smokeless tobacco: Never Used     Alcohol use Not on file     Drug use: Not on file     Sexual activity: Not on file     Other Topics Concern     Not on file     Social History Narrative       Review of Systems  General:  Denies problem  Eyes: Denies problem  Ears/Nose/Throat: Denies problem  Cardiovascular: Denies problem  Respiratory:  Denies problem  Gastrointestinal:  Denies problem,  Genitourinary: Denies problem  Musculoskeletal:  Denies problem  Skin: Denies problem  Neurologic: Denies problem  Psychiatric: Denies problem  Endocrine: Denies problem  Heme/Lymphatic: Denies problem   Allergic/Immunologic: Denies problem        Objective:         Vitals:    11/24/17 1549   BP: 118/78   Pulse: 72   SpO2: 98%   Weight: 175 lb (79.4 kg)     Body mass index is 28.25 kg/(m^2).    Physical Exam:  General Appearance: Alert, cooperative, no distress, appears stated age  Head: Normocephalic, without obvious abnormality, atraumatic  Eyes: PERRL, conjunctiva/corneas clear, EOM's intact  Ears: Normal TM's and external ear canals, both ears  Nose: Nares normal, septum midline,mucosa normal, no drainage  Throat: Lips, mucosa, and tongue normal; teeth and gums normal  Neck: Supple, symmetrical, trachea midline, no adenopathy;  thyroid: not enlarged, symmetric, no tenderness/mass/nodules; no carotid bruit or JVD  Back: Symmetric, no curvature, ROM normal, no CVA tenderness  Lungs: Clear to auscultation bilaterally, respirations unlabored  Breasts: No breast masses, tenderness, asymmetry, or nipple discharge.  Heart: Regular rate and rhythm, S1 and S2 normal, no murmur, rub, or gallop, Abdomen: Soft, non-tender, bowel sounds active all four quadrants,  no masses, no organomegaly  Pelvic:Normally developed genitalia with no external lesions or eruptions. Vagina and cervix show no lesions, inflammation, discharge or tenderness. No cystocele, No rectocele. Uterus normal.  No adnexal mass or tenderness.  Extremities: Extremities normal, atraumatic, no cyanosis or edema; no swelling of hand joints.  Skin: Skin color, texture, turgor normal, no rashes or lesions  Lymph nodes: Cervical, supraclavicular, and axillary nodes normal  Neurologic: Normal

## 2021-06-16 PROBLEM — N39.3 FEMALE STRESS INCONTINENCE: Status: ACTIVE | Noted: 2020-11-24

## 2021-06-16 PROBLEM — N92.0 MENORRHAGIA: Status: ACTIVE | Noted: 2020-11-24

## 2021-06-16 NOTE — PROGRESS NOTES
ASSESSMENT: Sonia Cordero is a 47 y.o. female with past medical history significant for anxiety who presents today for new patient evaluation of an 8 day history of right low back pain and right lower extremity pain with associated numbness, tingling, and weakness.  MRI lumbar spine shows a generalized disc bulge at L3-4 extending into both foramen which results in mild right foraminal stenosis.  There is no other right-sided neural compromise.  The patient does have some pain in an L3 distribution (buttock, lateral hip, and last week it extended to the anterior knee).  However, the patient also reported intermittent numbness and tingling extending into the anterior shin and dorsal foot last week (now resolved) which I cannot explain by the patient's MRI.  The patient does not have any right L5 compromise.  On exam today, the patient was neurologically intact.  She did have significant tenderness palpation of the right sacroiliac joint and reproduction of her pain with SI joint provocative maneuvers ×2, so I do believe that SI joint dysfunction is also contributing to her pain.    DARLINE: 26  Who 5: 10    PLAN:  A shared decision making model was used.  The patient's values and choices were respected.  The following represents what was discussed and decided upon by the physician assistant and the patient.      1.  DIAGNOSTIC TESTS: I reviewed the MRI lumbar spine.  No further diagnostic tests were ordered.  If the patient's numbness and tingling extending into the foot were to return, I would recommend an EMG of the right lower extremity for further evaluation.    2.  PHYSICAL THERAPY: The patient's primary care provider had previously referred the patient to physical therapy.  I agree with this recommendation.  I did enter a new order today so that the physical therapist will also address her right SI joint dysfunction.    3.  MEDICATIONS:    -I offered for the patient to try gabapentin.  She declined.  If pain fails  to improve further, I would recommend that we add gabapentin.  Patient will call our clinic to let us know if her pain is not improving so I can send this to her pharmacy.  -I recommended that the patient complete her course of prednisone.  She was prescribed 20 mg twice daily ×10 days by her primary care provider.  She is having difficulty sleeping at night which she attributes to her evening dose of prednisone.  I told the patient that she could take 40 mg in the morning so that she does not have to take an evening dose.  -The patient is also using Percocet as needed for pain.  She  states that she only takes this at bedtime because she does not want this medication to interfere with her ability to do her job.  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  I recommended that the patient use Percocet sparingly only for severe pain.  I would not recommend that she use this medication long-term.  -The patient can continue using Tylenol as needed.  -The patient is scheduled for a CT angiogram tomorrow for chest pain.  I recommended that the patient avoid NSAIDs until the etiology of her chest pain is determined.    4.  INTERVENTIONS: No interventions were ordered.  The patient may benefit from interventional pain management if she fails to improve with conservative treatment.  I would likely begin with a right L3-4 transforaminal epidural steroid injection.  If that did not help, recommend a right sacroiliac joint injection.    5.  PATIENT EDUCATION: Patient is in agreement with the above plan.  All questions were answered.    6.  FOLLOW-UP:   I will see the patient back in the clinic in 4 weeks.  She has any questions or concerns in the meantime, she should not hesitate to contact our clinic.      SUBJECTIVE:  Sonia Cordero  Is a 47 y.o. female who presents today in consultation at the request of Dr. Devlin for new patient evaluation of low back pain with right lower extremity pain, paresthesias, and  weakness.  The patient states that she began to have pain 8 days ago.  He states that the pain came on gradually.  She denies any injury or event to cause the pain.  She denies any change in her activity.  The patient states the pain became progressively more severe over the following 48 hours.  At that point the patient states the pain was so severe that she was unable to sit.  She states that she was unable to lift her right leg.  For this reason, she went to her primary care clinic.  She was prescribed prednisone and Percocet.  The patient states that with those medications, her pain is under better control.  An MRI lumbar spine was also ordered and she is referred to our clinic for further evaluation and treatment.  The patient states that she has had episodes of lower back pain in the past, but she has never had an episode with pain as severe as her current pain.    The patient states that when she first began to have pain she did not have any significant pain in the back itself.  Over the past couple of days, she has begun to notice some pain in the right lower back/upper buttock.  The pain then radiates into the lower buttock and into the proximal posterior thigh.  The pain wraps around into the lateral hip.  The patient states that last week, when her pain was severe, she felt pain wrapping around into the anterior knee.  When the pain is at its worse, she also had intermittent tingling which extended down the front of the shin and into the dorsum of the foot.  The tingling extending past the knee has resolved, but she states that she still has intermittent, slight tingling involving the buttock and thigh.  She describes the pain as a burning pain.  When the pain was at its worst, she had significant weakness in the right leg.  She noticed this when she tried to lift her right leg.  She states that weakness has improved significantly, but she still feels that the right leg is weaker than the left.  The  patient's symptoms are aggravated with sitting.  She states that when her son drove her to the doctor's office last week, the pain was very severe while she was sitting in the car.  Her pain is also aggravated with laying on her left side.  Applying ice helps to alleviate the pain.  Patient denies any significant left-sided pain.  She states that yesterday she felt some pain radiating across the right side of lower back to the left, but it did not radiate down the leg.  She denies any loss of bowel or bladder control.  She denies any recent fevers, chills, or sweats.    The patient went to the chiropractor who is a family friend of hers.  She states that the chiropractic manipulation gave relief for 1-2 days.  She has never had physical therapy for her lower back.  She has never had any spine surgery or spine injections.  The patient is currently on day 6 of 10 of a course of prednisone 20 mg twice daily.  She states that she is having difficulty sleeping which she thinks may be related to this medication.  She is also been using Percocet as needed.  She states that she only takes this at bedtime as she does not take it during the day which would interfere with her ability to work.  She thinks that the Percocet may also be making it difficult for her to sleep.  She has been using Tylenol as needed.    The patient is the owner of a med spa.  She is an .  She does injections.  She states that she was only able to work one day last week because of her pain.    Current Outpatient Prescriptions on File Prior to Encounter   Medication Sig Dispense Refill     buPROPion (WELLBUTRIN XL) 150 MG 24 hr tablet TAKE 1 TABLET BY MOUTH EVERY MORNING 90 tablet 3     oxyCODONE-acetaminophen (PERCOCET)  mg per tablet Take 1 tablet by mouth every 6 (six) hours as needed for pain. 20 tablet 0     predniSONE (DELTASONE) 20 MG tablet Take 1 tablet (20 mg total) by mouth 2 (two) times a day for 10 days. 20 tablet 0      ibuprofen (ADVIL,MOTRIN) 200 MG tablet Take 600 mg by mouth every 6 (six) hours as needed for pain.           No Known Allergies    Past Medical History:   Diagnosis Date     Breast cyst       Patient Active Problem List   Diagnosis     Postpartum Depression     Anxiety     Premenstrual Disorder     Acne     Vertigo     Major depressive disorder, single episode, moderate         Past Surgical History:   Procedure Laterality Date     BREAST CYST EXCISION Right 1997       Family History   Problem Relation Age of Onset     Breast cancer Maternal Grandmother 80     Breast cancer Maternal Aunt 40     Lung cancer Maternal Grandfather      Acute Myocardial Infarction Father      Social history: The patient is .  She is a licensed  and she owns a medical spa.  She has 4 children ranging in age from 17-11.  She denies tobacco use.  She drinks alcohol socially.  She denies illicit drug use.    ROS: Positive for chest pain/pressure, diarrhea, poor sleep quality, back pain, joint pain, leg pain.  Specifically negative for bowel/bladder dysfunction, fevers,chills, appetite changes, unexplained weight loss.   Otherwise 13 systems reviewed are negative.  Please see the patient's intake questionnaire from today for details.      OBJECTIVE:  PHYSICAL EXAMINATION:    CONSTITUTIONAL:  Vital signs as above.  No acute distress.  The patient is well nourished and well groomed.  PSYCHIATRIC:  The patient is awake, alert, oriented to person, place, time and answering questions appropriately with clear speech.    HEENT: Normocephalic, atraumatic.  Sclera clear.  Neck is supple.  SKIN:  Skin over the face, bilateral lower extremities, and posterior torso is clean, dry, intact without rashes.    GAIT:  Gait is non-antalgic.  The patient is able to heel and toe walk without significant difficulty.    STANDING EXAMINATION: Mild tenderness palpation of the right lower lumbar paraspinous muscles.  Moderate to severe tenderness  to palpation over the right sacroiliac joint.  Lumbar flexion severely restricted due to pain.  Lumbar extension mildly restricted due to pain.  MUSCLE STRENGTH:  The patient has 5/5 strength for the bilateral hip flexors, knee flexors/extensors, ankle dorsiflexors/plantar flexors, great toe extensors, ankle evertors/invertors.  NEUROLOGICAL:  2/4 patellar, and achilles reflexes bilaterally.  Negative Babinski's bilaterally.  No ankle clonus bilaterally. Sensation to light touch is intact in the bilateral L4, L5, and S1 dermatomes.  VASCULAR:  2/4 dorsalis pedis pulses bilaterally.  Bilateral lower extremities are warm.  There is no pitting edema of the bilateral lower extremities.  ABDOMINAL:  Soft, non-distended, non-tender throughout all quadrants.  No pulsatile mass palpated in the left lower quadrant.  LYMPH NODES:  No palpable or tender inguinal lymph nodes.  MUSCULOSKELETAL: Straight leg raise is positive on the right, negative on the left.  Negative pelvic distraction.  Positive thigh thrust on the right.  Positive Bautista's test on the right.    RESULTS: I reviewed the MRI lumbar spine from [facility] dated March 13, 2018.  At L3-4 there is a mild generalized disc bulge extending laterally into both foramen.  It is minimally more prominent laterally on the right.  This results in mild bilateral foraminal stenosis.  There is no right-sided disc protrusion or severe unilateral right-sided foraminal stenosis or significant right lateral recess stenosis.  There is mild disc degeneration in the lower lumbar discs with mild to moderate facet arthropathy in the lower lumbar facets.  There is no high-grade central canal stenosis.  Please see report for further details.

## 2021-06-16 NOTE — PROGRESS NOTES
Assessment/Plan:         Visit Diagnoses     Right leg pain    -  Primary    Relevant Orders    XR Hip Right 2 or More VWS (Completed)    Low back pain radiating to right leg        Relevant Medications    predniSONE (DELTASONE) 20 MG tablet    oxyCODONE-acetaminophen (PERCOCET)  mg per tablet    Other Relevant Orders    MR Lumbar Spine Without Contrast (Completed)      I am suspicious that her pain is related to a lumbar radiculopathy and I am recommending MRI of the lumbar spine.  The patient is concerned that pain is more related to her hip.  We will do a plain xray of her hip as well.  I am starting her on prednisone for symptoms relief from suspected radiculopathy.  Other symptomatic management as below.    Patient Instructions   1) Prednisone 20 mg twice daily for 10 days.  2) Percocet 1 tab every 4 hours as needed for pain.  3) Tylenol 2 tabs every 4 hours as needed for pain (Percocet counts as 1 Tylenol).  4) Proceed for MRI and xray as scheduled later today.       Subjective:   Sonia Cordero is a 47 y.o. female who presents today complaining of pain right posterior thigh for about 3 days.  No injury that she knows of.  No unusual activity.  The pain is there constantly.  She has been taking Ibuprofen 600 mg every 5 to 6 hours.  It is bothering her stomach.  No history of pain like this in the past.  She has chills but no fever.  She is having some nausea with the symptoms.  No trouble controlling bladder or bowels.  She has increase in pain with sitting.      Patient Active Problem List   Diagnosis     Postpartum Depression     Anxiety     Premenstrual Disorder     Acne     Vertigo     Major depressive disorder, single episode, moderate        Past Medical History:   Diagnosis Date     Breast cyst         Past Surgical History:   Procedure Laterality Date     BREAST CYST EXCISION Right 1997          Current Outpatient Prescriptions:      buPROPion (WELLBUTRIN XL) 150 MG 24 hr tablet, TAKE 1 TABLET BY  "MOUTH EVERY MORNING, Disp: 90 tablet, Rfl: 3     ibuprofen (ADVIL,MOTRIN) 200 MG tablet, Take 600 mg by mouth every 6 (six) hours as needed for pain., Disp: , Rfl:       Review of Systems     Objective:     Vitals:    03/13/18 1402   BP: 116/84   Patient Site: Left Arm   Patient Position: Standing   Cuff Size: Adult Regular   Pulse: 72   Temp: 98.2  F (36.8  C)   TempSrc: Oral   SpO2: 98%   Weight: 173 lb (78.5 kg)   Height: 5' 6\" (1.676 m)        Physical Exam   Constitutional: She appears distressed (patient pacing the room in clear discomfort.).   Cardiovascular: Normal rate and regular rhythm.    Pulmonary/Chest: Effort normal and breath sounds normal. No respiratory distress. She has no wheezes. She has no rales.   Musculoskeletal:        Right hip: She exhibits normal range of motion, no tenderness and no bony tenderness.        Left hip: She exhibits normal range of motion, no tenderness and no bony tenderness.   Neurological:   Reflex Scores:       Patellar reflexes are 2+ on the right side and 2+ on the left side.       Achilles reflexes are 2+ on the right side and 2+ on the left side.  Straight leg raise is positive on the right for pain in the back and worsening of pain down the leg.  Strength normal in the lower extremities.        "

## 2021-06-23 NOTE — TELEPHONE ENCOUNTER
Refill Approved    Rx renewed per Medication Renewal Policy. Medication was last renewed on 11/24/2017.          Aston Kim, [facility] Triage/Med Refill 2/3/2019     Requested Prescriptions   Pending Prescriptions Disp Refills     buPROPion (WELLBUTRIN XL) 150 MG 24 hr tablet [Pharmacy Med Name: BUPROPION XL 150MG TABLETS (24 H)] 90 tablet 0     Sig: TAKE 1 TABLET BY MOUTH EVERY MORNING    Tricyclics/Misc Antidepressant/Antianxiety Meds Refill Protocol Passed - 2/3/2019  7:22 AM       Passed - PCP or prescribing provider visit in last year    Last office visit with prescriber/PCP: 10/27/2015 Isatu Houser MD OR same dept: 3/13/2018 Anahi Devlin MD OR same specialty: 3/13/2018 Anahi Devlin MD  Last physical: 11/24/2017 Last MTM visit: Visit date not found   Next visit within 3 mo: Visit date not found  Next physical within 3 mo: Visit date not found  Prescriber OR PCP: Isatu Houser MD  Last diagnosis associated with med order: 1. Anxiety state  - buPROPion (WELLBUTRIN XL) 150 MG 24 hr tablet [Pharmacy Med Name: BUPROPION XL 150MG TABLETS (24 H)]; TAKE 1 TABLET BY MOUTH EVERY MORNING  Dispense: 90 tablet; Refill: 0    2. Major depressive disorder, single episode, moderate (H)  - buPROPion (WELLBUTRIN XL) 150 MG 24 hr tablet [Pharmacy Med Name: BUPROPION XL 150MG TABLETS (24 H)]; TAKE 1 TABLET BY MOUTH EVERY MORNING  Dispense: 90 tablet; Refill: 0    If protocol passes may refill for 12 months if within 3 months of last provider visit (or a total of 15 months).

## 2021-06-24 NOTE — PROGRESS NOTES
"Assessment:     1. Viral URI with cough     2. Anxiety state  buPROPion (WELLBUTRIN XL) 150 MG 24 hr tablet   3. Major depressive disorder, single episode, moderate (H)  buPROPion (WELLBUTRIN XL) 150 MG 24 hr tablet       Plan:     Milly is a 48-year-old female presenting today with upper respiratory symptoms of 1-1/2 weeks duration.  The patient feels like she is getting worse rather than better and was unable to sleep well last night due to the cough.  She does have some sinus pain and pressure on the left side as well.  I do think it would be best to empirically treat with a azithromycin for possible underlying bacterial component.  I also provided her with cough medication including Tessalon Perles and codeine cough syrup.  Lastly, the patient requested a prescription for Diflucan because she frequently gets yeast infections with any antibiotic.  The patient is doing well with her Wellbutrin and a prescription was provided today.  I encouraged her to schedule for an annual exam in the next 6 months.    Subjective:       48 y.o. female presents for evaluation of upper respiratory symptoms. The patient reports onset last Sunday and that the symptoms are getting worse. Last night, she couldn't fall asleep until 3 O'Clock in the morning. Her cough has been non-productive and she has not had a fever with this illness. She does have some slight shortness of breath. She denies any nasal discharge although her left sinus is a bit sore and her right ear is \"full.\"       Reviewed: The following portions of the patient's history were reviewed and updated as appropriate: allergies, current medications, past family history, past medical history, past social history, past surgical history and problem list.    Review of Systems  Pertinent items are noted in HPI.        Objective:     /72 (Patient Site: Left Arm, Patient Position: Sitting, Cuff Size: Adult Large)   Pulse 64   Temp 98.3  F (36.8  C) (Oral)   Wt 175 lb " (79.4 kg)   BMI 28.25 kg/m    General appearance: alert, appears stated age and cooperative  Ears: normal TM's and external ear canals both ears  Lungs: clear to auscultation bilaterally  Heart: regular rate and rhythm, S1, S2 normal, no murmur, click, rub or gallop      This note has been dictated using voice recognition software. Any grammatical or context distortions are unintentional and inherent to the software

## 2021-06-26 ENCOUNTER — HEALTH MAINTENANCE LETTER (OUTPATIENT)
Age: 51
End: 2021-06-26

## 2021-06-26 NOTE — PROGRESS NOTES
Progress Notes by Marta Kapoor MD at 3/8/2018  3:50 PM     Author: Marta Kapoor MD Service: -- Author Type: Physician    Filed: 3/8/2018  5:24 PM Encounter Date: 3/8/2018 Status: Signed    : Marta Kapoor MD (Physician)           Click to link to [facility]     [facility] HEART CARE NOTE    Thank you, Dr. Rico, for asking the [facility] team to see Ms. Sonia Cordero in the rapid access clinic to evaluate chest pain.    Assessment/Recommendations   Assessment:    1.  Chest pain, etiology unclear as workup in the ED is unremarkable including ECG and troponin level.  She did have a similar episode of chest discomfort several months ago for which she was seen in the ED where again her workup was unremarkable including a chest CT PE run.  She tells me she had similar symptoms several years ago and underwent a stress study which was a nuclear study also unrevealing.  She is quite concerned about coronary artery disease as her father  at age 55 of an MI.  At this point, I recommended CT coronary angiography which would allow us to assess her anatomy without any invasive procedure.  She is agreeable to proceed.    Plan:  1.  Set up CT coronary angiography with further recommendations to follow       History of Present Illness    Ms. Sonia Cordero is a 47 y.o. female with unremarkable past medical history but a history of early coronary artery disease in her father who  at age 55 who was awakened from sleep 2 nights ago with severe substernal chest discomfort.  She describes it as a squeezing sensation with radiation up into her jaw.  She did note accompanying shortness of breath but no diaphoresis or nausea.  She denied any associated bitter taste in the back of her throat.  Paramedics were summoned and she was given for sublingual nitroglycerin sprays in route to the ED with some improvement in symptoms but without complete relief.  In the ED, she was given additional sublingual  nitroglycerin.  Her stat ECG showed no acute ischemic changes and her troponin level was normal.  Patient did have a visit to the ER visit 2 months ago for similar episode of discomfort.  Again her EKG and troponin were normal.  She did undergo chest CT PE run which was negative for PE or aortic dissection.  She has undergone a stress study several years ago for similar symptoms which demonstrated no ischemia.  No prior history of acid reflux or other similar symptoms.    ECG (personally reviewed): ECG demonstrated normal sinus rhythm without acute ischemic changes    Cardiac Imaging Studies (personally reviewed): No recent cardiac imaging     Physical Examination Review of Systems   Vitals:    03/08/18 1554   BP: 114/72   Pulse: 72   Resp: 16     Body mass index is 27.92 kg/(m^2).  Wt Readings from Last 3 Encounters:   03/08/18 173 lb (78.5 kg)   03/06/18 165 lb (74.8 kg)   12/10/17 167 lb (75.8 kg)     General Appearance:   Awake, Alert, No acute distress.   HEENT:  No scleral icterus; the mucous membranes were pink and moist.   Neck: No cervical bruits or jugular venous distention    Chest: The spine was straight. The chest was symmetric.   Lungs:   Respirations unlabored; the lungs are clear to auscultation. No wheezing   Cardiovascular:    Regular rate and rhythm.  S1, S2 normal.  No murmur, gallop or rub.   Abdomen:  No organomegaly, masses, bruits, or tenderness. Bowels sounds are present   Extremities:  No peripheral edema bilaterally.   Skin: No xanthelasma. Warm, Dry.   Musculoskeletal: No tenderness.   Neurologic: Mood and affect are appropriate.    General: WNL  Eyes: WNL  Ears/Nose/Throat: WNL  Lungs: WNL  Heart: WNL  Stomach: WNL  Bladder: WNL  Muscle/Joints: WNL  Skin: WNL  Nervous System: WNL  Mental Health: WNL     Blood: WNL     Medical History  Surgical History Family History Social History   Past Medical History:   Diagnosis Date   ? Breast cyst     Past Surgical History:   Procedure Laterality  Date   ? BREAST CYST EXCISION Right 1997    Family History   Problem Relation Age of Onset   ? Breast cancer Maternal Grandmother 80   ? Breast cancer Maternal Aunt 40   ? Lung cancer Maternal Grandfather    ? Acute Myocardial Infarction Father     Social History     Social History   ? Marital status:      Spouse name: N/A   ? Number of children: N/A   ? Years of education: N/A     Occupational History   ? Not on file.     Social History Main Topics   ? Smoking status: Never Smoker   ? Smokeless tobacco: Never Used   ? Alcohol use Not on file   ? Drug use: Not on file   ? Sexual activity: Not on file     Other Topics Concern   ? Not on file     Social History Narrative          Medications  Allergies   Current Outpatient Prescriptions   Medication Sig Dispense Refill   ? buPROPion (WELLBUTRIN XL) 150 MG 24 hr tablet TAKE 1 TABLET BY MOUTH EVERY MORNING 90 tablet 3     No current facility-administered medications for this visit.       No Known Allergies      Lab Results    Chemistry/lipid CBC Cardiac Enzymes/BNP/TSH/INR   Lab Results   Component Value Date    CHOL 216 (H) 10/17/2016    HDL 72 10/17/2016    LDLCALC 124 10/17/2016    TRIG 101 10/17/2016    CREATININE 0.75 03/06/2018    BUN 9 03/06/2018    K 3.9 03/06/2018     03/06/2018     03/06/2018    CO2 25 03/06/2018    Lab Results   Component Value Date    WBC 10.0 03/06/2018    HGB 14.7 03/06/2018    HCT 44.1 03/06/2018    MCV 92 03/06/2018     03/06/2018    Lab Results   Component Value Date    CKTOTAL 33 10/17/2016    TROPONINI <0.01 03/06/2018    TSH 0.78 11/24/2017

## 2021-07-13 ENCOUNTER — RECORDS - HEALTHEAST (OUTPATIENT)
Dept: ADMINISTRATIVE | Facility: CLINIC | Age: 51
End: 2021-07-13

## 2021-07-21 ENCOUNTER — RECORDS - HEALTHEAST (OUTPATIENT)
Dept: ADMINISTRATIVE | Facility: CLINIC | Age: 51
End: 2021-07-21

## 2021-08-23 ENCOUNTER — ANCILLARY PROCEDURE (OUTPATIENT)
Dept: MAMMOGRAPHY | Facility: CLINIC | Age: 51
End: 2021-08-23
Attending: FAMILY MEDICINE
Payer: COMMERCIAL

## 2021-08-23 ENCOUNTER — LAB (OUTPATIENT)
Dept: LAB | Facility: CLINIC | Age: 51
End: 2021-08-23
Payer: COMMERCIAL

## 2021-08-23 DIAGNOSIS — Z13.220 LIPID SCREENING: Primary | ICD-10-CM

## 2021-08-23 DIAGNOSIS — Z13.1 SCREENING FOR DIABETES MELLITUS: ICD-10-CM

## 2021-08-23 DIAGNOSIS — Z12.31 VISIT FOR SCREENING MAMMOGRAM: ICD-10-CM

## 2021-08-23 DIAGNOSIS — Z13.220 LIPID SCREENING: ICD-10-CM

## 2021-08-23 LAB
CHOLEST SERPL-MCNC: 172 MG/DL
FASTING STATUS PATIENT QL REPORTED: YES
FASTING STATUS PATIENT QL REPORTED: YES
GLUCOSE BLD-MCNC: 79 MG/DL (ref 70–125)
HDLC SERPL-MCNC: 59 MG/DL
LDLC SERPL CALC-MCNC: 101 MG/DL
TRIGL SERPL-MCNC: 62 MG/DL

## 2021-08-23 PROCEDURE — 36415 COLL VENOUS BLD VENIPUNCTURE: CPT

## 2021-08-23 PROCEDURE — 82947 ASSAY GLUCOSE BLOOD QUANT: CPT

## 2021-08-23 PROCEDURE — 77067 SCR MAMMO BI INCL CAD: CPT

## 2021-08-23 PROCEDURE — 80061 LIPID PANEL: CPT

## 2021-10-16 ENCOUNTER — HEALTH MAINTENANCE LETTER (OUTPATIENT)
Age: 51
End: 2021-10-16

## 2021-12-11 ENCOUNTER — HEALTH MAINTENANCE LETTER (OUTPATIENT)
Age: 51
End: 2021-12-11

## 2022-09-01 ENCOUNTER — MYC MEDICAL ADVICE (OUTPATIENT)
Dept: FAMILY MEDICINE | Facility: CLINIC | Age: 52
End: 2022-09-01

## 2022-09-01 DIAGNOSIS — Z13.1 SCREENING FOR DIABETES MELLITUS: ICD-10-CM

## 2022-09-01 DIAGNOSIS — Z13.220 LIPID SCREENING: Primary | ICD-10-CM

## 2022-09-25 ENCOUNTER — HEALTH MAINTENANCE LETTER (OUTPATIENT)
Age: 52
End: 2022-09-25

## 2022-10-13 ENCOUNTER — TRANSFERRED RECORDS (OUTPATIENT)
Dept: HEALTH INFORMATION MANAGEMENT | Facility: CLINIC | Age: 52
End: 2022-10-13

## 2023-01-30 ENCOUNTER — TRANSFERRED RECORDS (OUTPATIENT)
Dept: HEALTH INFORMATION MANAGEMENT | Facility: CLINIC | Age: 53
End: 2023-01-30
Payer: COMMERCIAL

## 2023-02-04 ENCOUNTER — HEALTH MAINTENANCE LETTER (OUTPATIENT)
Age: 53
End: 2023-02-04

## 2023-02-20 ENCOUNTER — TRANSFERRED RECORDS (OUTPATIENT)
Dept: HEALTH INFORMATION MANAGEMENT | Facility: CLINIC | Age: 53
End: 2023-02-20
Payer: COMMERCIAL

## 2024-03-02 ENCOUNTER — HEALTH MAINTENANCE LETTER (OUTPATIENT)
Age: 54
End: 2024-03-02

## 2024-05-16 ENCOUNTER — TRANSFERRED RECORDS (OUTPATIENT)
Dept: HEALTH INFORMATION MANAGEMENT | Facility: CLINIC | Age: 54
End: 2024-05-16
Payer: COMMERCIAL

## 2024-05-16 ENCOUNTER — LAB REQUISITION (OUTPATIENT)
Dept: LAB | Facility: CLINIC | Age: 54
End: 2024-05-16

## 2024-05-16 DIAGNOSIS — N95.9 UNSPECIFIED MENOPAUSAL AND PERIMENOPAUSAL DISORDER: ICD-10-CM

## 2024-05-16 PROCEDURE — 84403 ASSAY OF TOTAL TESTOSTERONE: CPT | Performed by: STUDENT IN AN ORGANIZED HEALTH CARE EDUCATION/TRAINING PROGRAM

## 2024-05-16 PROCEDURE — 84270 ASSAY OF SEX HORMONE GLOBUL: CPT | Performed by: STUDENT IN AN ORGANIZED HEALTH CARE EDUCATION/TRAINING PROGRAM

## 2024-05-17 LAB — SHBG SERPL-SCNC: 77 NMOL/L (ref 30–135)

## 2024-05-23 LAB
TESTOST FREE SERPL-MCNC: 0.68 NG/DL
TESTOST SERPL-MCNC: 67 NG/DL (ref 8–60)

## 2025-02-21 ENCOUNTER — TRANSFERRED RECORDS (OUTPATIENT)
Dept: HEALTH INFORMATION MANAGEMENT | Facility: CLINIC | Age: 55
End: 2025-02-21
Payer: COMMERCIAL

## 2025-05-02 ENCOUNTER — TRANSFERRED RECORDS (OUTPATIENT)
Dept: HEALTH INFORMATION MANAGEMENT | Facility: CLINIC | Age: 55
End: 2025-05-02
Payer: COMMERCIAL

## 2025-05-08 ENCOUNTER — RESULTS FOLLOW-UP (OUTPATIENT)
Dept: GASTROENTEROLOGY | Facility: CLINIC | Age: 55
End: 2025-05-08